# Patient Record
Sex: FEMALE | Race: BLACK OR AFRICAN AMERICAN | Employment: OTHER | URBAN - METROPOLITAN AREA
[De-identification: names, ages, dates, MRNs, and addresses within clinical notes are randomized per-mention and may not be internally consistent; named-entity substitution may affect disease eponyms.]

---

## 2017-03-21 PROCEDURE — 82570 ASSAY OF URINE CREATININE: CPT | Performed by: INTERNAL MEDICINE

## 2017-03-21 PROCEDURE — 83036 HEMOGLOBIN GLYCOSYLATED A1C: CPT | Performed by: INTERNAL MEDICINE

## 2017-03-21 PROCEDURE — 82607 VITAMIN B-12: CPT | Performed by: INTERNAL MEDICINE

## 2017-03-21 PROCEDURE — 82550 ASSAY OF CK (CPK): CPT | Performed by: INTERNAL MEDICINE

## 2017-03-21 PROCEDURE — 80061 LIPID PANEL: CPT | Performed by: INTERNAL MEDICINE

## 2017-03-21 PROCEDURE — 36415 COLL VENOUS BLD VENIPUNCTURE: CPT | Performed by: INTERNAL MEDICINE

## 2017-03-21 PROCEDURE — 82043 UR ALBUMIN QUANTITATIVE: CPT | Performed by: INTERNAL MEDICINE

## 2017-03-21 PROCEDURE — 80053 COMPREHEN METABOLIC PANEL: CPT | Performed by: INTERNAL MEDICINE

## 2017-03-21 PROCEDURE — 81003 URINALYSIS AUTO W/O SCOPE: CPT | Performed by: INTERNAL MEDICINE

## 2017-03-21 PROCEDURE — 84443 ASSAY THYROID STIM HORMONE: CPT | Performed by: INTERNAL MEDICINE

## 2017-06-13 PROBLEM — Z79.810 USE OF TAMOXIFEN (NOLVADEX): Status: ACTIVE | Noted: 2017-06-13

## 2017-06-14 ENCOUNTER — HOSPITAL ENCOUNTER (OUTPATIENT)
Facility: HOSPITAL | Age: 72
Setting detail: HOSPITAL OUTPATIENT SURGERY
Discharge: HOME | End: 2017-06-14
Attending: INTERNAL MEDICINE | Admitting: INTERNAL MEDICINE
Payer: MEDICARE

## 2017-06-14 ENCOUNTER — SURGERY (OUTPATIENT)
Age: 72
End: 2017-06-14

## 2017-06-14 PROCEDURE — 88305 TISSUE EXAM BY PATHOLOGIST: CPT | Performed by: INTERNAL MEDICINE

## 2017-06-14 PROCEDURE — 0DBP8ZX EXCISION OF RECTUM, VIA NATURAL OR ARTIFICIAL OPENING ENDOSCOPIC, DIAGNOSTIC: ICD-10-PCS | Performed by: INTERNAL MEDICINE

## 2017-06-14 PROCEDURE — 0D5P8ZZ DESTRUCTION OF RECTUM, VIA NATURAL OR ARTIFICIAL OPENING ENDOSCOPIC: ICD-10-PCS | Performed by: INTERNAL MEDICINE

## 2017-06-14 RX ORDER — MIDAZOLAM HYDROCHLORIDE 1 MG/ML
INJECTION INTRAMUSCULAR; INTRAVENOUS
Status: DISCONTINUED | OUTPATIENT
Start: 2017-06-14 | End: 2017-06-14

## 2017-06-14 RX ORDER — MIDAZOLAM HYDROCHLORIDE 1 MG/ML
1 INJECTION INTRAMUSCULAR; INTRAVENOUS EVERY 5 MIN PRN
Status: DISCONTINUED | OUTPATIENT
Start: 2017-06-14 | End: 2017-06-14

## 2017-06-14 RX ORDER — SODIUM CHLORIDE 0.9 % (FLUSH) 0.9 %
10 SYRINGE (ML) INJECTION AS NEEDED
Status: DISCONTINUED | OUTPATIENT
Start: 2017-06-14 | End: 2017-06-14

## 2017-06-14 RX ORDER — SODIUM CHLORIDE, SODIUM LACTATE, POTASSIUM CHLORIDE, CALCIUM CHLORIDE 600; 310; 30; 20 MG/100ML; MG/100ML; MG/100ML; MG/100ML
INJECTION, SOLUTION INTRAVENOUS CONTINUOUS
Status: DISCONTINUED | OUTPATIENT
Start: 2017-06-14 | End: 2017-06-14

## 2017-06-14 NOTE — OPERATIVE REPORT
COLONOSCOPY REPORT    Patient Name:  Valdemar Severe Record #: [id number]  YOB: 1945  Date of Procedure: 6/14/2017    Referring physician: Isidro Trevino MD     Colonoscopy to cecum and TI with hot snare polypectomy and coagulation

## 2017-06-15 VITALS
RESPIRATION RATE: 13 BRPM | OXYGEN SATURATION: 100 % | HEIGHT: 63 IN | DIASTOLIC BLOOD PRESSURE: 87 MMHG | HEART RATE: 51 BPM | SYSTOLIC BLOOD PRESSURE: 131 MMHG | BODY MASS INDEX: 34.91 KG/M2 | WEIGHT: 197 LBS

## 2017-06-15 NOTE — PROGRESS NOTES
Quick Note:    6/15/2017  Shawna Coyne  [address] [facility] [address]    Dear Ami Chand,       Here are the biopsy/pathology findings from your recent Colonoscopy :    an adenomatous polyp(s), which is a benign potentially pre-cancerous growth that was re

## 2018-04-20 PROCEDURE — 82607 VITAMIN B-12: CPT | Performed by: INTERNAL MEDICINE

## 2018-04-20 PROCEDURE — 82043 UR ALBUMIN QUANTITATIVE: CPT | Performed by: INTERNAL MEDICINE

## 2018-04-20 PROCEDURE — 82570 ASSAY OF URINE CREATININE: CPT | Performed by: INTERNAL MEDICINE

## 2018-07-23 PROCEDURE — 83970 ASSAY OF PARATHORMONE: CPT | Performed by: INTERNAL MEDICINE

## 2018-11-19 PROCEDURE — 88175 CYTOPATH C/V AUTO FLUID REDO: CPT | Performed by: OBSTETRICS & GYNECOLOGY

## 2019-01-21 PROBLEM — Z79.810 USE OF TAMOXIFEN (NOLVADEX): Status: RESOLVED | Noted: 2017-06-13 | Resolved: 2019-01-21

## 2019-06-11 PROBLEM — I12.9 SECONDARY DM WITH CKD STAGE 3 AND HYPERTENSION (HCC): Status: ACTIVE | Noted: 2019-06-11

## 2019-06-11 PROBLEM — N18.30 SECONDARY DM WITH CKD STAGE 3 AND HYPERTENSION (HCC): Status: ACTIVE | Noted: 2019-06-11

## 2019-06-11 PROBLEM — E13.22 SECONDARY DM WITH CKD STAGE 3 AND HYPERTENSION (HCC): Status: ACTIVE | Noted: 2019-06-11

## 2022-04-08 PROBLEM — Z86.0100 HISTORY OF COLON POLYPS: Status: ACTIVE | Noted: 2022-04-08

## 2022-04-08 PROBLEM — Z86.19 HISTORY OF HELICOBACTER PYLORI INFECTION: Status: ACTIVE | Noted: 2022-04-08

## 2022-04-08 PROBLEM — Z86.010 HISTORY OF COLON POLYPS: Status: ACTIVE | Noted: 2022-04-08

## 2023-03-27 RX ORDER — MELATONIN
325
COMMUNITY

## 2023-04-04 ENCOUNTER — HOSPITAL ENCOUNTER (OUTPATIENT)
Facility: HOSPITAL | Age: 78
Setting detail: HOSPITAL OUTPATIENT SURGERY
Discharge: HOME | End: 2023-04-04
Attending: INTERNAL MEDICINE | Admitting: INTERNAL MEDICINE
Payer: MEDICARE

## 2023-04-04 VITALS
HEIGHT: 63 IN | BODY MASS INDEX: 34.91 KG/M2 | SYSTOLIC BLOOD PRESSURE: 140 MMHG | WEIGHT: 197 LBS | HEART RATE: 70 BPM | OXYGEN SATURATION: 96 % | RESPIRATION RATE: 12 BRPM | DIASTOLIC BLOOD PRESSURE: 81 MMHG

## 2023-04-04 DIAGNOSIS — Q43.9 ANORECTAL ANOMALY: ICD-10-CM

## 2023-04-04 LAB — GLUCOSE BLDC GLUCOMTR-MCNC: 157 MG/DL (ref 70–99)

## 2023-04-04 PROCEDURE — 99152 MOD SED SAME PHYS/QHP 5/>YRS: CPT | Performed by: INTERNAL MEDICINE

## 2023-04-04 PROCEDURE — 0DBP8ZX EXCISION OF RECTUM, VIA NATURAL OR ARTIFICIAL OPENING ENDOSCOPIC, DIAGNOSTIC: ICD-10-PCS | Performed by: INTERNAL MEDICINE

## 2023-04-04 PROCEDURE — 82962 GLUCOSE BLOOD TEST: CPT

## 2023-04-04 PROCEDURE — 88305 TISSUE EXAM BY PATHOLOGIST: CPT | Performed by: INTERNAL MEDICINE

## 2023-04-04 RX ORDER — SODIUM CHLORIDE 0.9 % (FLUSH) 0.9 %
10 SYRINGE (ML) INJECTION AS NEEDED
Status: DISCONTINUED | OUTPATIENT
Start: 2023-04-04 | End: 2023-04-04

## 2023-04-04 RX ORDER — SODIUM CHLORIDE, SODIUM LACTATE, POTASSIUM CHLORIDE, CALCIUM CHLORIDE 600; 310; 30; 20 MG/100ML; MG/100ML; MG/100ML; MG/100ML
INJECTION, SOLUTION INTRAVENOUS CONTINUOUS
Status: DISCONTINUED | OUTPATIENT
Start: 2023-04-04 | End: 2023-04-04

## 2023-04-04 RX ORDER — MIDAZOLAM HYDROCHLORIDE 1 MG/ML
1 INJECTION INTRAMUSCULAR; INTRAVENOUS EVERY 5 MIN PRN
Status: DISCONTINUED | OUTPATIENT
Start: 2023-04-04 | End: 2023-04-04

## 2023-04-04 RX ORDER — MIDAZOLAM HYDROCHLORIDE 1 MG/ML
INJECTION INTRAMUSCULAR; INTRAVENOUS
Status: DISCONTINUED | OUTPATIENT
Start: 2023-04-04 | End: 2023-04-04

## 2023-04-04 NOTE — OPERATIVE REPORT
Flexible Sigmoidoscopy Operative Report    Vernon Clark Patient Status:  [facility]    1945 MRN [id number]   Location [facility] ENDOSCOPY LAB SUITES Attending Billy Swan MD   Hosp Day #   0 PCP Tj Avendano MD     Pre-Operative Diagnosis:   Anorectal anomaly    Post-Operative Diagnosis:  1. The prep was poor in most areas of the colon visualized. 2.  The scope was advanced to the mid sigmoid colon and could not be advanced further due to solid stool. 3.  There was evidence of left-sided diverticulosis. However no other sigmoid irregularities noted  4. On retroflexed view, there is evidence of a rectal lesion located just above the dentate line and abutting the dentate line. This lesion could be 2 separate lesions with 1 appearing to be atypical neoplastic polyp measuring approximately 12 mm. Adjacent to this and possibly communicating to this polyp was another rectal lesion along the dentate line that was mildly ulcerated and had more of a smooth appearance suggesting possible rectal prolapse related. Biopsies were taken from this anal rectal lesion separately. Biopsies were also taken from the likely rectal polyp in a separate container. This was not fully removed this was adjacent to mild to moderate sized hemorrhoids. 5.  Grade 2 internal hemorrhoids    Procedure Performed: FLEXIBLE SIGMOIDOSCOPY    Informed Consent: Informed consent for both the procedure and sedation were obtained from the patient. The potentially life-threatening complications of sedation, bleeding,  perforation, transfusion or repeat endoscopy  were reviewed along with the possible need for hospitalization, surgical management, transfusion or repeat endoscopy should one of these complications arise. The patient understands and is agreeable to proceed.   Sedation Type: Conscious Sedation- 5 mg of Versed, 100 mcg of Fentanyl given in divided doses as per protocol  Moderate Sedation Time: 15 min  A trained sedation nurse was present to assist in monitoring the patient during the entire length of moderate sedation time. Cecum Withdrawal Time:  N/A  Date of previous colonoscopy:  4/2022    Procedure Description: The patient was placed in the left lateral decubitus position. After careful digital rectal examination, the Pediatric colonoscope was inserted into the rectum and advanced to the level of the mid sigmoid colon under direct visualization. The cecum was identified by landmarks, including the appendiceal orifice and ileoceccal valve. Careful examination of the entire colon was performed during withdrawal of the endoscope. The scope was withdrawn to the rectum and retroflexion was performed. The patient tolerated the procedure well with no immediate complications. The patient was transferred to the recovery area in stable condition. Quality of Preparation: Adequate  Aronchick Bowel Prep Scale: Fair to Poor - 4  Findings:   1. The prep was poor in most areas of the colon visualized. 2.  The scope was advanced to the mid sigmoid colon and could not be advanced further due to solid stool. 3.  There was evidence of left-sided diverticulosis. However no other sigmoid irregularities noted  4. On retroflexed view, there is evidence of a rectal lesion located just above the dentate line and abutting the dentate line. This lesion could be 2 separate lesions with 1 appearing to be atypical neoplastic polyp measuring approximately 12 mm. Adjacent to this and possibly communicating to this polyp was another rectal lesion along the dentate line that was mildly ulcerated and had more of a smooth appearance suggesting possible rectal prolapse related. Biopsies were taken from this anal rectal lesion separately. Biopsies were also taken from the likely rectal polyp in a separate container. This was not fully removed this was adjacent to mild to moderate sized hemorrhoids.     5.  Grade 2 internal hemorrhoids    Recommendations:   1. Follow-up pathology results  2. We will need to determine endoscopic removal pending the biopsy results. This may need to be done with Dr. Mercedes Liu vs  transanal resection with the surgery service  Discharge: The patient was given an after visit summary detailing the procedure, findings, recommendations and follow up plans.      Aarti Saucedo MD  4/4/2023  8:00 AM

## 2023-04-04 NOTE — DISCHARGE INSTRUCTIONS

## 2023-05-31 ENCOUNTER — ANESTHESIA EVENT (OUTPATIENT)
Dept: ENDOSCOPY | Facility: HOSPITAL | Age: 78
End: 2023-05-31
Payer: MEDICARE

## 2023-05-31 ENCOUNTER — ANESTHESIA (OUTPATIENT)
Dept: ENDOSCOPY | Facility: HOSPITAL | Age: 78
End: 2023-05-31
Payer: MEDICARE

## 2023-05-31 ENCOUNTER — HOSPITAL ENCOUNTER (OUTPATIENT)
Facility: HOSPITAL | Age: 78
Setting detail: HOSPITAL OUTPATIENT SURGERY
Discharge: HOME | End: 2023-05-31
Attending: INTERNAL MEDICINE | Admitting: INTERNAL MEDICINE
Payer: MEDICARE

## 2023-05-31 VITALS
BODY MASS INDEX: 34.91 KG/M2 | SYSTOLIC BLOOD PRESSURE: 155 MMHG | TEMPERATURE: 98 F | HEART RATE: 56 BPM | WEIGHT: 197 LBS | OXYGEN SATURATION: 98 % | DIASTOLIC BLOOD PRESSURE: 76 MMHG | RESPIRATION RATE: 22 BRPM | HEIGHT: 63 IN

## 2023-05-31 DIAGNOSIS — D12.8 RECTAL ADENOMA: ICD-10-CM

## 2023-05-31 LAB — GLUCOSE BLDC GLUCOMTR-MCNC: 145 MG/DL (ref 70–99)

## 2023-05-31 PROCEDURE — 82962 GLUCOSE BLOOD TEST: CPT

## 2023-05-31 PROCEDURE — 88305 TISSUE EXAM BY PATHOLOGIST: CPT | Performed by: INTERNAL MEDICINE

## 2023-05-31 PROCEDURE — 0DBP8ZX EXCISION OF RECTUM, VIA NATURAL OR ARTIFICIAL OPENING ENDOSCOPIC, DIAGNOSTIC: ICD-10-PCS | Performed by: INTERNAL MEDICINE

## 2023-05-31 PROCEDURE — 3E0H8GC INTRODUCTION OF OTHER THERAPEUTIC SUBSTANCE INTO LOWER GI, VIA NATURAL OR ARTIFICIAL OPENING ENDOSCOPIC: ICD-10-PCS | Performed by: INTERNAL MEDICINE

## 2023-05-31 RX ORDER — SODIUM CHLORIDE, SODIUM LACTATE, POTASSIUM CHLORIDE, CALCIUM CHLORIDE 600; 310; 30; 20 MG/100ML; MG/100ML; MG/100ML; MG/100ML
INJECTION, SOLUTION INTRAVENOUS CONTINUOUS
Status: DISCONTINUED | OUTPATIENT
Start: 2023-05-31 | End: 2023-05-31

## 2023-05-31 RX ORDER — LIDOCAINE HYDROCHLORIDE 10 MG/ML
INJECTION, SOLUTION EPIDURAL; INFILTRATION; INTRACAUDAL; PERINEURAL AS NEEDED
Status: DISCONTINUED | OUTPATIENT
Start: 2023-05-31 | End: 2023-05-31 | Stop reason: SURG

## 2023-05-31 RX ADMIN — LIDOCAINE HYDROCHLORIDE 30 MG: 10 INJECTION, SOLUTION EPIDURAL; INFILTRATION; INTRACAUDAL; PERINEURAL at 13:11:00

## 2023-05-31 RX ADMIN — SODIUM CHLORIDE, SODIUM LACTATE, POTASSIUM CHLORIDE, CALCIUM CHLORIDE: 600; 310; 30; 20 INJECTION, SOLUTION INTRAVENOUS at 13:08:00

## 2023-05-31 RX ADMIN — SODIUM CHLORIDE, SODIUM LACTATE, POTASSIUM CHLORIDE, CALCIUM CHLORIDE: 600; 310; 30; 20 INJECTION, SOLUTION INTRAVENOUS at 14:06:00

## 2023-05-31 NOTE — DISCHARGE INSTRUCTIONS
Clear liquid diet for today then full liquids tomorrow then soft for 3 days then as tolerated. Use stool softener once daily. (Colace)  Monitor for signs of bleeding. Take Augmentin 875 mg twice a day starting tonight for 5 days. Use topical lidocaine for pain. Home Care Instructions for Colonoscopy with Sedation    Diet:  - Resume your regular diet as tolerated unless otherwise instructed. - Start with light meals to minimize bloating.  - Do not drink alcohol today. Medication:  - If you have questions about resuming your normal medications, please contact your Primary Care Physician. Activities:  - Take it easy today. Do not return to work today. - Do not drive today. - Do not operate any machinery today (including kitchen equipment). Colonoscopy:  - You may notice some rectal \"spotting\" (a little blood on the toilet tissue) for a day or two after the exam. This is normal.  - If you experience any rectal bleeding (not spotting), persistent tenderness or sharp severe abdominal pains, oral temperature over 100 degrees Fahrenheit, light-headedness or dizziness, or any other problems, contact your doctor. **If unable to reach your doctor, please go to the [facility] Emergency Room**    - Your referring physician will receive a full report of your examination.  - If you do not hear from your doctor's office within two weeks of your biopsy, please call them for your results. You may be able to see your laboratory results in [facility] between 4 and 7 business days. In some cases, your physician may not have viewed the results before they are released to [address] If you have questions regarding your results contact the physician who ordered the test/exam by phone or via [address] by choosing \"Ask a Medical Question. \"

## 2023-05-31 NOTE — OPERATIVE REPORT
[id number]  Susan Devries  2/23/1945 5/31/2023      Preoperative Diagnosis: Large rectal polyp. Patient was referred for endoscopic mucosal resection  Postoperative Diagnosis: Hemorrhoids and large rectal polyp  Procedure Performed: Colonoscopy to the cecum with endoscopic mucosal resection    Anesthesia Given:  MAC  Medication: Zosyn 3.375 g intravenously was given  Colon  Preparation: Adequate  Colonoscopy withdrawal time: 48 minutes  Specimen: Rectal polyp  Endoscopist:   Elizabeth Peacock MD  EBL: none  Complications: no immediate  Informed Consent: Informed consent for both the procedure and sedation were obtained from the patient. The potentially life-threatening complications of sedation, bleeding,  Perforation, transfusion or repeat endoscopy were reviewed along with the possible need for hospitalization, surgical management, transfusion or repeat endoscopy should one of these complications arise. The patient understands and is agreeable to proceed. Procedure:   After the patient was interviewed and the procedure again discussed and questions addressed, the patient was brought to the GI Lab and monitoring of the B/P, pulse, and pulse oximetry was performed. The patient was then placed in the left lateral decubitus position and sedated with divided doses of IV medication; continuous vital signs were monitored throughout the procedure. The Olympus video adult variable stiffness colonoscope was then inserted into the rectum after an unremarkable digital rectal exam.  The distal end clear detachable cap was fitted onto the tip of the scope prior to inserting it into the rectum. The endoscope was advanced to the cecum without difficulty; upon insertion and withdrawl the mucosa was carefully examined and the preparation was adequate. The visualized mucosal pattern was unremarkable.     In the rectum starting from the dentate line there was approximately 2 cm adenomatous appearing polyp occupying approximately 30% of the circumference of the rectal wall. To the left of it there appeared to be a prolapsing hemorrhoid. The overlying rectal mucosa appeared unremarkable. At this point the scope was exchanged for the adult flexible upper scope. After placement of the distal end clear detachable cap the scope was introduced into the rectum. The lesion was then lifted with ELIVIEW lifting solution and resected utilizing an EMR technique with the use of 13 mm cautery snare. Few submucosal bleeding blood vessels were cauterized with the use of the soft coag Olympus grasper. The margins of resection were then fulgurated with the tip of the coag grasper to minimize polyp recurrence. A total of 4 endoscopic clips were placed at the site of resection to minimize post EMR bleed. No obvious perforation or active bleeding was noted at the end of the procedure. The patient was informed of the endoscopic findings and was also given a copy of the findings, postoperative instructions, and postoperative precautions. IMPRESSION:  Findings discussed above    PLAN:      Clear liquid diet for today then full liquids tomorrow then soft for 3 days then as tolerated. Use stool softener once daily. Monitor for signs of bleeding. Take Augmentin 875 mg twice a day starting tonight for 5 days. Check final biopsy results. If no high-grade dysplasia or invasive cancer then flexible sigmoidoscopy in 6 months.     Shashank Singh MD

## 2024-12-23 ENCOUNTER — ANESTHESIA (OUTPATIENT)
Dept: ENDOSCOPY | Facility: HOSPITAL | Age: 79
End: 2024-12-23
Payer: MEDICARE

## 2024-12-23 ENCOUNTER — HOSPITAL ENCOUNTER (OUTPATIENT)
Facility: HOSPITAL | Age: 79
Setting detail: HOSPITAL OUTPATIENT SURGERY
Discharge: HOME | End: 2024-12-23
Attending: INTERNAL MEDICINE | Admitting: INTERNAL MEDICINE
Payer: MEDICARE

## 2024-12-23 ENCOUNTER — ANESTHESIA EVENT (OUTPATIENT)
Dept: ENDOSCOPY | Facility: HOSPITAL | Age: 79
End: 2024-12-23
Payer: MEDICARE

## 2024-12-23 VITALS
HEIGHT: 63 IN | DIASTOLIC BLOOD PRESSURE: 67 MMHG | OXYGEN SATURATION: 98 % | RESPIRATION RATE: 15 BRPM | SYSTOLIC BLOOD PRESSURE: 121 MMHG | WEIGHT: 195 LBS | HEART RATE: 63 BPM | BODY MASS INDEX: 34.55 KG/M2

## 2024-12-23 DIAGNOSIS — D12.8 ADENOMATOUS RECTAL POLYP: ICD-10-CM

## 2024-12-23 DIAGNOSIS — D12.8 RECTAL ADENOMA: ICD-10-CM

## 2024-12-23 LAB — GLUCOSE BLDC GLUCOMTR-MCNC: 160 MG/DL (ref 70–99)

## 2024-12-23 PROCEDURE — 0DBP8ZX EXCISION OF RECTUM, VIA NATURAL OR ARTIFICIAL OPENING ENDOSCOPIC, DIAGNOSTIC: ICD-10-PCS | Performed by: INTERNAL MEDICINE

## 2024-12-23 PROCEDURE — 88305 TISSUE EXAM BY PATHOLOGIST: CPT | Performed by: INTERNAL MEDICINE

## 2024-12-23 PROCEDURE — 82962 GLUCOSE BLOOD TEST: CPT

## 2024-12-23 PROCEDURE — 0DBN8ZX EXCISION OF SIGMOID COLON, VIA NATURAL OR ARTIFICIAL OPENING ENDOSCOPIC, DIAGNOSTIC: ICD-10-PCS | Performed by: INTERNAL MEDICINE

## 2024-12-23 PROCEDURE — 0DBK8ZX EXCISION OF ASCENDING COLON, VIA NATURAL OR ARTIFICIAL OPENING ENDOSCOPIC, DIAGNOSTIC: ICD-10-PCS | Performed by: INTERNAL MEDICINE

## 2024-12-23 RX ORDER — LIDOCAINE HYDROCHLORIDE 10 MG/ML
INJECTION, SOLUTION EPIDURAL; INFILTRATION; INTRACAUDAL; PERINEURAL AS NEEDED
Status: DISCONTINUED | OUTPATIENT
Start: 2024-12-23 | End: 2024-12-23 | Stop reason: SURG

## 2024-12-23 RX ORDER — NALOXONE HYDROCHLORIDE 0.4 MG/ML
0.08 INJECTION, SOLUTION INTRAMUSCULAR; INTRAVENOUS; SUBCUTANEOUS ONCE AS NEEDED
Status: DISCONTINUED | OUTPATIENT
Start: 2024-12-23 | End: 2024-12-23

## 2024-12-23 RX ORDER — SODIUM CHLORIDE, SODIUM LACTATE, POTASSIUM CHLORIDE, CALCIUM CHLORIDE 600; 310; 30; 20 MG/100ML; MG/100ML; MG/100ML; MG/100ML
INJECTION, SOLUTION INTRAVENOUS CONTINUOUS
Status: DISCONTINUED | OUTPATIENT
Start: 2024-12-23 | End: 2024-12-23

## 2024-12-23 RX ADMIN — SODIUM CHLORIDE, SODIUM LACTATE, POTASSIUM CHLORIDE, CALCIUM CHLORIDE: 600; 310; 30; 20 INJECTION, SOLUTION INTRAVENOUS at 10:01:00

## 2024-12-23 RX ADMIN — LIDOCAINE HYDROCHLORIDE 50 MG: 10 INJECTION, SOLUTION EPIDURAL; INFILTRATION; INTRACAUDAL; PERINEURAL at 10:04:00

## 2024-12-23 RX ADMIN — SODIUM CHLORIDE, SODIUM LACTATE, POTASSIUM CHLORIDE, CALCIUM CHLORIDE: 600; 310; 30; 20 INJECTION, SOLUTION INTRAVENOUS at 10:24:00

## 2024-12-23 NOTE — OPERATIVE REPORT
Colonoscopy Operative Report    Florence Dexter Patient Status:  [facility] Outpatient Surgery    1945 MRN [id number]   Location [facility] ENDOSCOPY LAB SUITES Attending Jermaine Candelaria MD   Hosp Day #   0 PCP Debi Pineda MD     Pre-Operative Diagnosis: Adenomatous rectal polyp/Rectal adenoma    Post-Operative Diagnosis:  There was an ascending colon sessile polyp 3 mm fully removed with cold forceps.   There was a sigmoid colon sessile polyp 3 mm removed with cold forceps.  There is a rectal polypoid lesion in the distalmost rectum at the level of the anorectal junction.  This was minimally ulcerated and measured approximately 12 mm.  This was only biopsied with cold forceps as this was in an area of grade 2 internal hemorrhoids as well.  This had an appearance of recurrent neoplastic polyp.   Left-sided diverticulosis  Small internal hemorrhoids    Procedure Performed: COLONOSCOPY with biopsies    Informed Consent: Informed consent for both the procedure and sedation were obtained from the patient. The potentially life-threatening complications of sedation, bleeding,  perforation, transfusion or repeat endoscopy  were reviewed along with the possible need for hospitalization, surgical management, transfusion or repeat endoscopy should one of these complications arise. The patient understands and is agreeable to proceed.  Sedation Type: MAC-Patient received sedation with monitored anesthesia provided by an anesthesiologist  Moderate Sedation Time: None.  Deep sedation provided by anesthesia.  Cecum Withdrawal Time:  12 min  Date of previous colonoscopy:     Procedure Description: The patient was placed in the left lateral decubitus position.  After careful digital rectal examination, the Adult colonoscope was inserted into the rectum and advanced to the level of the cecum under direct visualization. The cecum was identified by landmarks, including  the appendiceal orifice and ileoceccal valve. Careful examination of the entire colon was performed during withdrawal of the endoscope. The scope was withdrawn to the rectum and retroflexion was performed.  The patient tolerated the procedure well with no immediate complications. The patient was transferred to the recovery area in stable condition.  Quality of Preparation: Adequate  Aronchick Bowel Prep Scale:  Excellent - 1    Recommendations:   Follow-up pathology results  Fiber supplementation such as benefiber or citrucel daily.  Repeat flex sig/colonoscopy pending biopsy results  Discharge:  The patient was given an after visit summary detailing the procedure, findings, recommendations and follow up plans.     Jermaine Candelaria MD  12/23/2024  10:21 AM

## 2024-12-23 NOTE — ANESTHESIA PREPROCEDURE EVALUATION
Anesthesia PreOp Note    HPI:     Florence Dexter is a 79 year old female who presents for preoperative consultation requested by: Jermaine Candelaria MD    Date of Surgery: 12/23/2024    Procedure(s):  COLONOSCOPY  Indication: Adenomatous rectal polyp/Rectal adenoma    Relevant Problems   No relevant active problems       NPO:  Last Liquid Consumption Date: 12/23/24  Last Liquid Consumption Time: 0500  Last Solid Consumption Date: 12/21/24  Last Solid Consumption Time: 1500  Last Liquid Consumption Date: 12/23/24          History Review:  Patient Active Problem List    Diagnosis Date Noted    History of colon polyps 04/08/2022    History of Helicobacter pylori infection 04/08/2022    Secondary DM with CKD stage 3 and hypertension (HCC) 06/11/2019    BMI 34.0-34.9,adult 09/20/2016    Vitreous syneresis of both eyes 05/27/2016    Renal disease due to hypertension 03/09/2016    Type 2 diabetes mellitus with stage 3a chronic kidney disease, without long-term current use of insulin (HCC) 03/30/2015    History of breast cancer 02/11/2013    Essential hypertension 01/29/2013    Dyslipidemia associated with type 2 diabetes mellitus (HCC) 01/29/2013    Single kidney 01/29/2013       Past Medical History:    Breast cancer (HCC)    Left DCIS    Calculus of kidney    Cataract, bilateral    DCIS (ductal carcinoma in situ) of breast    high grade; lumpectomy; no radiation    High blood pressure    High cholesterol    Hx of motion sickness    Renal disorder    LEFT KIDNEY REMOVAL 1992    Type II or unspecified type diabetes mellitus without mention of complication, not stated as uncontrolled       Past Surgical History:   Procedure Laterality Date    Cataract      Colonoscopy  2007    Colonoscopy N/A 6/14/2017    Procedure: COLONOSCOPY;  Surgeon: Hernandez Mullins MD;  Location: [facility] ENDOSCOPY    Colonoscopy N/A 5/31/2023    Procedure: COLONOSCOPY with ENDOSCOPIC MUCOSAL RESECTION;  Surgeon: Trev Field MD;  Location: [facility] ENDOSCOPY     D & c  2012    D AND C; OVARIAN CYST-RESOLVED    Dilation/curettage,diagnostic      Hx breast cancer Left May 2013    DCIS    Kidney surgery      Lumpectomy left  5/24/13    DCIS    Nephrectomy Left     Other surgical history  1992    LEFT NEPHRECTOMY/KIDNEY STONE    Other surgical history  05/2013    left lumpectomy       Prescriptions Prior to Admission[1]  Current Medications and Prescriptions Ordered in Epic[2]    Allergies[3]    Family History   Problem Relation Age of Onset    Cancer Father         STOMACH    Diabetes Mother         TYPE 2    Heart Disorder Mother 70        STROKE/MI    Hypertension Mother     Lipids Mother     Diabetes Son 35        TYPE 2    Obesity Son     Diabetes Sister     Cancer Sister 68        OVARIAN     Social History     Socioeconomic History    Marital status:    Occupational History    Occupation: Retired   Tobacco Use    Smoking status: Never    Smokeless tobacco: Never   Vaping Use    Vaping status: Never Used   Substance and Sexual Activity    Alcohol use: No    Drug use: No    Sexual activity: Yes     Partners: Male     Comment: ;  dialysis oct 2017   Other Topics Concern     Service No    Blood Transfusions No    Caffeine Concern No    Occupational Exposure No    Hobby Hazards No    Sleep Concern No    Stress Concern No    Weight Concern No    Special Diet Yes     Comment: diabetic    Exercise Yes     Comment: WALK 2X/WEEK 10 MIN    Seat Belt Yes    Self-Exams Yes       Available pre-op labs reviewed.     Lab Results   Component Value Date    PGLU 160 (H) 12/23/2024          Vital Signs:  Body mass index is 34.54 kg/m².   height is 1.6 m (5' 3\") and weight is 88.5 kg (195 lb). Her blood pressure is 124/63 and her pulse is 69. Her respiration is 18 and oxygen saturation is 98%.   Vitals:    12/18/24 1237 12/23/24 0932   BP:  124/63   Pulse:  69   Resp:  18   SpO2:  98%   Weight: 88.5 kg (195 lb)    Height: 1.6 m (5' 3\")         Anesthesia  Evaluation     Patient summary reviewed and Nursing notes reviewed    Airway   Mallampati: II  TM distance: >3 FB  Dental    (+) partials    Comment: Upper partial and several missing teeth on bottom    Pulmonary - negative ROS and normal exam    breath sounds clear to auscultation  Cardiovascular - negative ROS and normal exam  Exercise tolerance: good  (+) hypertension    Rhythm: regular  Rate: normal    Neuro/Psych - negative ROS     GI/Hepatic/Renal - negative ROS   (+) chronic renal disease CRI    Endo/Other - negative ROS   (+) diabetes mellitus  Abdominal   (+) obese                 Anesthesia Plan:   ASA:  3  Plan:   MAC  Informed Consent Plan and Risks Discussed With:  Patient      I have informed Florence Dexter and/or legal guardian or family member of the nature of the anesthetic plan, benefits, risks including possible dental damage if relevant, major complications, and any alternative forms of anesthetic management.   All of the patient's questions were answered to the best of my ability. The patient desires the anesthetic management as planned.  Sarah Boecker, LAURO  12/23/2024 9:54 AM  Present on Admission:  **None**           [1]   Medications Prior to Admission   Medication Sig Dispense Refill Last Dose/Taking    ferrous sulfate 325 (65 FE) MG Oral Tab EC Take 1 tablet (325 mg total) by mouth daily with breakfast.   12/16/2024    amLODIPine 5 MG Oral Tab Take 1 tablet (5 mg total) by mouth daily. 90 tablet 2 12/22/2024 Morning    nateglinide 120 MG Oral Tab Take 1 tablet (120 mg total) by mouth 3 (three) times daily with meals. 270 tablet 3 12/21/2024    atorvastatin 10 MG Oral Tab Take 1 tablet (10 mg total) by mouth nightly. 90 tablet 3 12/21/2024    metFORMIN HCl  MG Oral Tablet 24 Hr Take 2 tablets (1,000 mg total) by mouth 2 (two) times daily with meals. 360 tablet 3 12/22/2024 Morning    glipiZIDE ER 10 MG Oral Tablet 24 Hr Take 1 tablet (10 mg total) by mouth 2 (two) times daily. 180 tablet  1 12/22/2024 Morning    losartan 100 MG Oral Tab Take 1 tablet (100 mg total) by mouth daily. 90 tablet 3 12/22/2024 Morning    ERGOCALCIFEROL 1.25 MG (58383 UT) Oral Cap TAKE 1 CAPSULE BY MOUTH EVERY 14 DAYS 6 capsule 2 12/9/2024    aspirin 81 MG Oral Tab Take 1 tablet (81 mg total) by mouth daily. 90 tablet 1 12/16/2024    OneTouch Delica Lancets 33G Does not apply Misc 1 lancet by Does not apply route daily. 1 Box 3     ONETOUCH ULTRASOFT LANCETS Does not apply Misc CHECK SUGAR DAILY 100 each 3     Glucose Blood In Vitro Strip Check sugar daily 100 each 3     Blood Glucose Monitoring Suppl (ONETOUCH ULTRA 2) w/Device Does not apply Kit E11.22 N18.3  May substitute kit and test strips and lancets as covered by insurance 1 kit 0    [2]   Current Facility-Administered Medications Ordered in Epic   Medication Dose Route Frequency Provider Last Rate Last Admin    lactated ringers infusion   Intravenous Continuous Jermaine Candelaria MD 20 mL/hr at 12/23/24 0933 New Bag at 12/23/24 0933     No current [facility]-ordered outpatient medications on file.   [3]   Allergies  Allergen Reactions    Pravastatin MYALGIA     elev cpk

## 2024-12-23 NOTE — DISCHARGE INSTRUCTIONS
Home Care Instructions for Colonoscopy  Diet:  - Resume your regular diet as tolerated unless otherwise instructed.  - Start with light meals to minimize bloating.  - Do not drink alcohol today.    Medication:  - If you have questions about resuming your normal medications, please contact your Primary Care Physician.    Activities:  - Take it easy today. Do not return to work today.  - Do not drive today.  - Do not operate any machinery today (including kitchen equipment).    Colonoscopy:  - You may notice some rectal \"spotting\" (a little blood on the toilet tissue) for a day or two after the exam. This is normal.  - If you experience any rectal bleeding (not spotting), persistent tenderness or sharp severe abdominal pains, oral temperature over 100 degrees Fahrenheit, light-headedness or dizziness, or any other problems, contact your doctor.      **If unable to reach your doctor, please go to the [facility] Emergency Room**    - Your referring physician will receive a full report of your examination.  - If you do not hear from your doctor's office within two weeks of your biopsy, please call them for your results.    You may be able to see your laboratory results in [facility] between 4 and 7 business days.  In some cases, your physician may not have viewed the results before they are released to [employer].  If you have questions regarding your results contact the physician who ordered the test/exam by phone or via [facility] by choosing \"Ask a Medical Question.\"

## 2024-12-23 NOTE — H&P
REFERRING PHYSICIAN: Dr. Leslie ref. provider found  Occupation:  [employer]     HPI:   Florence Dexter is a 79 year old female.      Patient presents with:  Follow - Up        This is a 78 y/o AA female with pmh sig for HTN, DM II, hyperlipidemia here for follow up of iron deficiency anemia.  She was first told about iron deficiency anemia in early 2022.  Her hemoglobin was noted to be 10.9.  MCV was normal.  Ferritin was low end of normal at 15, iron saturation low at 10%.       Her initial colonoscopy in 6/2017 with one 7mm tubular adenoma removed.   No Gerd.  No dysphagia.    She had previous EGD/colonoscopy with me on 4/5/22:  Post-Operative Diagnosis:   1.  Benign appearing small distal esophageal strictures.  S/p biopsies.  Nonobstructive, scope passes freely.  Biopsies negative  2.  Mild gastritis - + hpylori  3.  Normal duodenum - biopsies negative  4.  Cecal/appendiceal polyp removed - TA  5.  Ascending colon 12mm removed - TA  6.  Ascending colon small polyp, 3mm removed, significant oozing s/p clips X 2.  TA  7.  Left sided diverticulosis  8.  Rectal lesion at the dentate line.  ? Inflamed hemorrhoid vs neoplastic polyp.  S/p biopsies - mucosal prolapse  9.  Grade II internal hemorrhoids     Patient was treated with prevpac for the hpylori infection.  She tolerated well.  Negative hpylori stool antigen in 5/2022.       She had repeat flex sig with me in 4/2023.  This revealed:  Post-Operative Diagnosis:  1. The prep was poor in most areas of the colon visualized.  2. The scope was advanced to the mid sigmoid colon and could not be advanced further due to solid stool.  3. There was evidence of left-sided diverticulosis. However no other sigmoid irregularities noted  4. On retroflexed view, there is evidence of a rectal lesion located just above the dentate line and abutting the dentate line. This lesion could be 2 separate lesions with 1 appearing to be atypical neoplastic polyp measuring approximately 12  mm. Adjacent to this and possibly communicating to this polyp was another rectal lesion along the dentate line that was mildly ulcerated and had more of a smooth appearance suggesting possible rectal prolapse related. Biopsies were taken from this anal rectal lesion separately. Biopsies were also taken from the likely rectal polyp in a separate container. This was not fully removed this was adjacent to mild to moderate sized hemorrhoids.  BIOPSIES RETURNED WITH TUBULAR ADENOMAS.  5. Grade 2 internal hemorrhoids     She underwent subsequent Colonoscopy with EMR of large rectal polyp on 5/31/23.  Polyp was a 2-3 cm polyp that was removed with EMR.  Site was clipped.   Pathology with evidence of severe/high-grade dysplasia.    She saw me in 9/2023.  She was feeling well.    Repeat Colonoscopy performed 10/19/23.  This revealed:  Post-Operative Diagnosis:   There was evidence of a rectal polyp in the distal rectum in the area of previous polypectomy.  This measured about 15mm.  This was removed with hot cautery snare.  There was evidence of some mild residual polyp at the edges that were fully removed with cold forceps.  Polyp was felt to be fully removed and extended to the dentate line.   Rectal mucosal with only prolapse features.  No neoplastic tissue.  No other colon polyps  Left-sided diverticulosis        Occasional lower abdominal discomfort, but otherwise feeling well. No diarrhea.   Occasional constipation.  No rectal bleeding.   Weight has been stable.                      Current Outpatient Medications   Medication Sig Dispense Refill    losartan 100 MG Oral Tab TAKE 1 TABLET EVERY  tablet 0    metFORMIN  MG Oral Tablet 24 Hr TAKE 2 TABLETS TWICE A DAY WITH MEALS 400 tablet 0    Alcohol Swabs (DROPSAFE ALCOHOL PREP) 70 % Does not apply Pads USE AS DIRECTED TO CHECK BLOOD SUGAR ONE TIME DAILY 200 each 3    amLODIPine 5 MG Oral Tab Take 1 tablet (5 mg total) by mouth daily. 90 tablet 1     Accu-Chek Softclix Lancets Does not apply Misc TEST BLOOD SUGAR TWICE DAILY 200 each 0    nateglinide 120 MG Oral Tab TAKE 1 TABLET TWICE DAILY WITH MEALS 200 tablet 0    glipiZIDE ER 10 MG Oral Tablet 24 Hr Take 1 tablet (10 mg total) by mouth 2 (two) times daily. 200 tablet 1    Glucose Blood (ACCU-CHEK GUIDE) In Vitro Strip TEST BLOOD SUGAR TWICE DAILY 200 strip 3    ergocalciferol 1.25 MG (56889 UT) Oral Cap TAKE 1 CAPSULE BY MOUTH EVERY 14 DAYS 6 capsule 2    ATORVASTATIN 10 MG Oral Tab TAKE 1 TABLET EVERY NIGHT 90 tablet 3    Blood Glucose Monitoring Suppl (ACCU-CHEK GUIDE ME) w/Device Does not apply Kit TEST ONCE DAILY 1 kit 0    hydrocortisone 25 MG Rectal Suppos Place 1 suppository (25 mg total) rectally 2 (two) times daily as needed for Hemorrhoids. 30 suppository 2    aspirin 81 MG Oral Tab Take 1 tablet (81 mg total) by mouth daily. 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     high grade; lumpectomy; no radiation    High blood pressure      High cholesterol      Hx of lithotripsy      Type II or unspecified type diabetes mellitus without mention of complication, not stated as uncontrolled 2008            Past Surgical History:   Procedure Laterality Date    CATARACT        COLONOSCOPY   2007    COLONOSCOPY N/A 06/14/2017     Procedure: COLONOSCOPY;  Surgeon: Hernandez Mullins MD;  Location: [facility] ENDOSCOPY    COLONOSCOPY        D & C   2012     D AND C; OVARIAN CYST-RESOLVED    DILATION/CURETTAGE,DIAGNOSTIC        EGD N/A 04/05/2022     Procedure: ESOPHAGOGASTRODUODENOSCOPY, with bxs COLONOSCOPY with polypectomy and bxs;  Surgeon: Jermaine Candelaria MD;  Location: [facility], [facility]    HX BREAST CANCER Left 05/2013     DCIS    KIDNEY SURGERY Left       NEPHRECTOMY    LUMPECTOMY LEFT   05/24/2013     DCIS    NEPHRECTOMY  Left      OTHER SURGICAL HISTORY   1992     LEFT NEPHRECTOMY/KIDNEY STONE    OTHER SURGICAL HISTORY   05/2013     left lumpectomy    UPPER GI ENDOSCOPY,EXAM          Social History    Tobacco Use      Smoking status: Never        Passive exposure: Never      Smokeless tobacco: Never    Vaping Use      Vaping status: Never Used    Alcohol use: No    Drug use: No         FAMILY HX: GI HX: None, no hx of colon cancer        Family History   Problem Relation Age of Onset    Cancer Father           STOMACH    Diabetes Mother           TYPE 2    Heart Disorder Mother 70         STROKE/MI    Hypertension Mother      Lipids Mother      Diabetes Son 35         TYPE 2    Obesity Son      Diabetes Sister      Cancer Sister 68         OVARIAN         REVIEW OF SYSTEMS:   GENERAL: feels well otherwise  SKIN: denies any unusual skin lesions  EYES: denies blurred vision or double vision  HEENT: denies nasal congestion, sinus pain or ST  LUNGS: denies shortness of breath with exertion  CARDIOVASCULAR: denies chest pain on exertion  GI: as above     EXAM:   Ht 5' 3\" (1.6 m)   Wt 198 lb (89.8 kg)   BMI 35.07 kg/m²   GENERAL: well developed, well nourished, in no apparent distress  SKIN: no rashes, no suspicious lesions  HEENT: atraumatic, normocephalic, ears and throat are clear  EYES: PERRLA, EOMI, normal optic disk,conjunctiva are clear  NECK: supple, no adenopathy, no bruits  CHEST: no chest tenderness  LUNGS: clear to auscultation  CARDIO: RRR without murmur  GI: good BS's and no masses, HSM or tenderness  RECTAL: Exam not done.  MUSCULOSKELETAL: back is not tender,FROM of the back  EXTREMITIES: no cyanosis, clubbing or edema  NEURO: Oriented times three, cranial nerves are intact, motor and sensory are grossly intact        ASSESSMENT AND PLAN:      1.  Iron deficiency anemia  -hgb level normal in 3/2024.     -EGD/colonoscopy in 2022 with hpylori gastritis s/p prevpac, colon polyps, rectal lesion - biopsies negative.   -repeat  flex sig and colonoscopy in 4/2023 and 5/2023 as above.  No other source for iron deficiency.      -still on iron pill once daily.       2.  History of colon polyps  -multiple colon polyps removed at time of previous colonoscopy.    -flex sig with EMR of rectal polyp in 5/2023:  TVA with HGD.  Removed.   -repeat colonoscopy in 10/2023:  small residual polyp - nonneoplastic prolapse tissue.       3.  History of nephrectomy.   -only has one kidney  -Cr baseline of 1.06     4.  Hpylori gastritis  -s/p prevpac.   -stool for hpylori Ag NEGATIVE IN 5/2022.   Confirmed eradication     5.  Advanced rectal polyp - s/p removal with EMR in 5/2023.   -biopsies with high grade dysplasia  -doing well.    -repeat colonoscopy in 10/2023:  negative.          Recs:  1.  Continue iron pills once daily  2.  colonoscopy  3.  Fiber supplementation such as benefiber or citrucel daily.

## 2024-12-23 NOTE — ANESTHESIA POSTPROCEDURE EVALUATION
9Patient: Florence Dexter    Procedure Summary       Date: 12/23/24 Room / Location: [facility] ENDOSCOPY 05 / [facility] ENDOSCOPY    Anesthesia Start: 1001 Anesthesia Stop: 1024    Procedure: COLONOSCOPY Diagnosis:       Adenomatous rectal polyp      Rectal adenoma      (colon polyps, diverticulosis, rectal lesion)    Surgeons: Jermaine Candelaria MD Anesthesiologist: Boecker, Sarah, CRNA    Anesthesia Type: MAC ASA Status: 3            Anesthesia Type: MAC    Vitals Value Taken Time   /69 12/23/24 1025   Temp 97 12/23/24 1028   Pulse 65 12/23/24 1027   Resp 16 12/23/24 1027   SpO2 96 % 12/23/24 1027   Vitals shown include unfiled device data.    [facility] AN Post Evaluation:   Patient Evaluated in PACU  Patient Participation: complete - patient participated  Level of Consciousness: awake and alert  Pain Score: 0  Pain Management: adequate  Airway Patency:patent  Dental exam unchanged from preop  Yes    Nausea/Vomiting: none  Cardiovascular Status: acceptable  Respiratory Status: acceptable  Postoperative Hydration acceptable      Sarah Boecker, CRNA  12/23/2024 10:28 AM

## (undated) DEVICE — CAP SEALING, REVEAL 15.0MM

## (undated) DEVICE — CO2 CANNULA,SSOFT,ADLT,7O2,4CO2,FEMALE: Brand: MEDLINE

## (undated) DEVICE — 60 ML SYRINGE REGULAR TIP: Brand: MONOJECT

## (undated) DEVICE — SINGLE USE ELECTROSURGICAL HEMOSTATIC FORCEPS: Brand: SINGLE USE ELECTROSURGICAL HEMOSTATIC FORCEPS

## (undated) DEVICE — FORCEP RADIAL JAW 4

## (undated) DEVICE — KIT CLEAN ENDOKIT 1.1OZ GOWNX2

## (undated) DEVICE — Device: Brand: DUAL NARE NASAL CANNULAE FEMALE LUER CON 7FT O2 TUBE

## (undated) DEVICE — TRAP 4 CPTR CHMBR N EZ INLN

## (undated) DEVICE — MEDI-VAC NON-CONDUCTIVE SUCTION TUBING 6MM X 1.8M (6FT.) L: Brand: CARDINAL HEALTH

## (undated) DEVICE — SNARE CAPTIFLEX MICRO-OVL OLY

## (undated) DEVICE — REM POLYHESIVE ADULT PATIENT RETURN ELECTRODE: Brand: VALLEYLAB

## (undated) DEVICE — NEEDLE CONTRAST INTERJECT 25G

## (undated) DEVICE — KIT ENDO ORCAPOD 160/180/190

## (undated) DEVICE — HEXAGONAL CAPTIVATOR STIFF 13M

## (undated) DEVICE — Device

## (undated) DEVICE — ENDOSCOPY PACK - LOWER: Brand: MEDLINE INDUSTRIES, INC.

## (undated) DEVICE — V2 SPECIMEN COLLECTION MANIFOLD KIT: Brand: NEPTUNE

## (undated) DEVICE — CLIP HEMOSTASIS LOCKADO 16X235

## (undated) DEVICE — GIJAW SINGLE-USE BIOPSY FORCEPS WITH NEEDLE: Brand: GIJAW

## (undated) DEVICE — Device: Brand: DEFENDO AIR/WATER/SUCTION AND BIOPSY VALVE

## (undated) DEVICE — ELEVIEW 10ML AMPOULES SNGL USE

## (undated) NOTE — LETTER
[address]  Authorization for Invasive Procedure                                                                                           I hereby authorize Beto Miranda MD, my physician and his/her assistants (if applicable), which may include medical students, residents, and/or fellows, to perform the following surgical operation/ procedure and administer such anesthesia as may be determined necessary by my physician: Operation/Procedure name (s) COLONOSCOPY with ENDOSCOPIC MUCOSAL RESECTION on [place]   2. I recognize that during the surgical operation/procedure, unforeseen conditions may necessitate additional or different procedures than those listed above. I, therefore, further authorize and request that the above-named surgeon, assistants, or designees perform such procedures as are, in their judgment, necessary and desirable. 3.   My surgeon/physician has discussed prior to my surgery the potential benefits, risks and side effects of this procedure; the likelihood of achieving goals; and potential problems that might occur during recuperation. They also discussed reasonable alternatives to the procedure, including risks, benefits, and side effects related to the alternatives and risks related to not receiving this procedure. I have had all my questions answered and I acknowledge that no guarantee has been made as to the result that may be obtained. 4.   Should the need arise during my operation/procedure, which includes change of level of care prior to discharge, I also consent to the administration of blood and/or blood products. Further, I understand that despite careful testing and screening of blood or blood products by collecting agencies, I may still be subject to ill effects as a result of receiving a blood transfusion and/or blood products.   The following are some, but not all, of the potential risks that can occur: fever and allergic reactions, hemolytic reactions, transmission of diseases such as Hepatitis, AIDS and Cytomegalovirus (CMV) and fluid overload. In the event that I wish to have an autologous transfusion of my own blood, or a directed donor transfusion, I will discuss this with my physician. Check only if Refusing Blood or Blood Products  I understand refusal of blood or blood products as deemed necessary by my physician may have serious consequences to my condition to include possible death. I hereby assume responsibility for my refusal and release the hospital, its personnel, and my physicians from any responsibility for the consequences of my refusal.    o  Refuse   5. I authorize the use of any specimen, organs, tissues, body parts or foreign objects that may be removed from my body during the operation/procedure for diagnosis, research or teaching purposes and their subsequent disposal by hospital authorities. I also authorize the release of specimen test results and/or written reports to my treating physician on the hospital medical staff or other referring or consulting physicians involved in my care, at the discretion of the Pathologist or my treating physician. 6.   I consent to the photographing or videotaping of the operations or procedures to be performed, including appropriate portions of my body for medical, scientific, or educational purposes, provided my identity is not revealed by the pictures or by descriptive texts accompanying them. If the procedure has been photographed/videotaped, the surgeon will obtain the original picture, image, videotape or CD. The hospital will not be responsible for storage, release or maintenance of the picture, image, tape or CD.    7.   I consent to the presence of a  or observers in the operating room as deemed necessary by my physician or their designees.     8.   I recognize that in the event my procedure results in extended X-Ray/fluoroscopy time, I may develop a skin reaction. 9. If I have a Do Not Attempt Resuscitation (DNAR) order in place, that status will be suspended while in the operating room, procedural suite, and during the recovery period unless otherwise explicitly stated by me (or a person authorized to consent on my behalf). The surgeon or my attending physician will determine when the applicable recovery period ends for purposes of reinstating the DNAR order. 10. Patients having a sterilization procedure: I understand that if the procedure is successful the results will be permanent and it will therefore be impossible for me to inseminate, conceive, or bear children. I also understand that the procedure is intended to result in sterility, although the result has not been guaranteed. 11. I acknowledge that my physician has explained sedation/analgesia administration to me including the risk and benefits I consent to the administration of sedation/analgesia as may be necessary or desirable in the judgment of my physician. I CERTIFY THAT I HAVE READ AND FULLY UNDERSTAND THE ABOVE CONSENT TO OPERATION and/or OTHER PROCEDURE.     _________________________________________ _________________________________     ___________________________________  Signature of Patient     Signature of Responsible Person                   Printed Name of Responsible Person                              _________________________________________ ______________________________        ___________________________________  Signature of Witness         Date  Time         Relationship to Patient    STATEMENT OF PHYSICIAN My signature below affirms that prior to the time of the procedure; I have explained to the patient and/or his/her legal representative, the risks and benefits involved in the proposed treatment and any reasonable alternative to the proposed treatment.  I have also explained the risks and benefits involved in refusal of the proposed treatment and alternatives to the proposed treatment and have answered the patient's questions.  If I have a significant financial interest in a co-management agreement or a significant financial interest in any product or implant, or other significant relationship used in this procedure/surgery, I have disclosed this and had a discussion with my patient.     _______________________________________________________________ _____________________________  Titi Jhaveri Physician)                                                                                         (Date)                                   (Time)  Patient Name: Joshua Stapleton    : 1945   Printed: 2023      Medical Record #: [id number]                                              Page 1 of 1

## (undated) NOTE — LETTER
[facility]  [address], [place], [place]    Authorization for Surgical Operation and Procedure                               I hereby authorize Jermaine Candelaria MD, my physician and his/her assistants (if applicable), which may include medical students, residents, and/or fellows, to perform the following surgical operation/ procedure and administer such anesthesia as may be determined necessary by my physician: Operation/Procedure name (s) COLONOSCOPY on Florence Dexter   2.   I recognize that during the surgical operation/procedure, unforeseen conditions may necessitate additional or different procedures than those listed above.  I, therefore, further authorize and request that the above-named surgeon, assistants, or designees perform such procedures as are, in their judgment, necessary and desirable.    3.   My surgeon/physician has discussed prior to my surgery the potential benefits, risks and side effects of this procedure; the likelihood of achieving goals; and potential problems that might occur during recuperation.  They also discussed reasonable alternatives to the procedure, including risks, benefits, and side effects related to the alternatives and risks related to not receiving this procedure.  I have had all my questions answered and I acknowledge that no guarantee has been made as to the result that may be obtained.    4.   Should the need arise during my operation/procedure, which includes change of level of care prior to discharge, I also consent to the administration of blood and/or blood products.  Further, I understand that despite careful testing and screening of blood or blood products by collecting agencies, I may still be subject to ill effects as a result of receiving a blood transfusion and/or blood products.  The following are some, but not all, of the potential risks that can occur: fever and allergic reactions, hemolytic reactions, transmission of diseases such as  Hepatitis, AIDS and Cytomegalovirus (CMV) and fluid overload.  In the event that I wish to have an autologous transfusion of my own blood, or a directed donor transfusion, I will discuss this with my physician.  Check only if Refusing Blood or Blood Products  I understand refusal of blood or blood products as deemed necessary by my physician may have serious consequences to my condition to include possible death. I hereby assume responsibility for my refusal and release the hospital, its personnel, and my physicians from any responsibility for the consequences of my refusal.    o  Refuse   5.   I authorize the use of any specimen, organs, tissues, body parts or foreign objects that may be removed from my body during the operation/procedure for diagnosis, research or teaching purposes and their subsequent disposal by hospital authorities.  I also authorize the release of specimen test results and/or written reports to my treating physician on the hospital medical staff or other referring or consulting physicians involved in my care, at the discretion of the Pathologist or my treating physician.    6.   I consent to the photographing or videotaping of the operations or procedures to be performed, including appropriate portions of my body for medical, scientific, or educational purposes, provided my identity is not revealed by the pictures or by descriptive texts accompanying them.  If the procedure has been photographed/videotaped, the surgeon will obtain the original picture, image, videotape or CD.  The hospital will not be responsible for storage, release or maintenance of the picture, image, tape or CD.    7.   I consent to the presence of a  or observers in the operating room as deemed necessary by my physician or their designees.    8.   I recognize that in the event my procedure results in extended X-Ray/fluoroscopy time, I may develop a skin reaction.    9. If I have a Do Not Attempt  Resuscitation (DNAR) order in place, that status will be suspended while in the operating room, procedural suite, and during the recovery period unless otherwise explicitly stated by me (or a person authorized to consent on my behalf). The surgeon or my attending physician will determine when the applicable recovery period ends for purposes of reinstating the DNAR order.  10. Patients having a sterilization procedure: I understand that if the procedure is successful the results will be permanent and it will therefore be impossible for me to inseminate, conceive, or bear children.  I also understand that the procedure is intended to result in sterility, although the result has not been guaranteed.   11. I acknowledge that my physician has explained sedation/analgesia administration to me including the risk and benefits I consent to the administration of sedation/analgesia as may be necessary or desirable in the judgment of my physician.    I CERTIFY THAT I HAVE READ AND FULLY UNDERSTAND THE ABOVE CONSENT TO OPERATION and/or OTHER PROCEDURE.     ____________________________________  _________________________________        ______________________________  Signature of Patient    Signature of Responsible Person                Printed Name of Responsible Person                                      ____________________________________  _____________________________                ________________________________  Signature of Witness        Date  Time         Relationship to Patient    STATEMENT OF PHYSICIAN My signature below affirms that prior to the time of the procedure; I have explained to the patient and/or his/her legal representative, the risks and benefits involved in the proposed treatment and any reasonable alternative to the proposed treatment. I have also explained the risks and benefits involved in refusal of the proposed treatment and alternatives to the proposed treatment and have answered the patient's  questions. If I have a significant financial interest in a co-management agreement or a significant financial interest in any product or implant, or other significant relationship used in this procedure/surgery, I have disclosed this and had a discussion with my patient.     _____________________________________________________              _____________________________  (Signature of Physician)                                                                                         (Date)                                   (Time)  Patient Name: Florence Dexter      : 1945      Printed: 2024     Medical Record #: [id number]                                      Page 1 of 1

## (undated) NOTE — LETTER
[address]  Authorization for Invasive Procedure                                                                                           1. I hereby authorize Bishop Eunice MD, my physician and his/her assistants (if applicable), which may include medical students, residents, and/or fellows, to perform the following surgical operation/ procedure and administer such anesthesia as may be determined necessary by my physician: Operation/Procedure name (s) [address] on [address]   2. I recognize that during the surgical operation/procedure, unforeseen conditions may necessitate additional or different procedures than those listed above. I, therefore, further authorize and request that the above-named surgeon, assistants, or designees perform such procedures as are, in their judgment, necessary and desirable. 3.   My surgeon/physician has discussed prior to my surgery the potential benefits, risks and side effects of this procedure; the likelihood of achieving goals; and potential problems that might occur during recuperation. They also discussed reasonable alternatives to the procedure, including risks, benefits, and side effects related to the alternatives and risks related to not receiving this procedure. I have had all my questions answered and I acknowledge that no guarantee has been made as to the result that may be obtained. 4.   Should the need arise during my operation/procedure, which includes change of level of care prior to discharge, I also consent to the administration of blood and/or blood products. Further, I understand that despite careful testing and screening of blood or blood products by collecting agencies, I may still be subject to ill effects as a result of receiving a blood transfusion and/or blood products.   The following are some, but not all, of the potential risks that can occur: fever and allergic reactions, hemolytic reactions, transmission of diseases such as Hepatitis, AIDS and Cytomegalovirus (CMV) and fluid overload. In the event that I wish to have an autologous transfusion of my own blood, or a directed donor transfusion, I will discuss this with my physician. Check only if Refusing Blood or Blood Products  I understand refusal of blood or blood products as deemed necessary by my physician may have serious consequences to my condition to include possible death. I hereby assume responsibility for my refusal and release the hospital, its personnel, and my physicians from any responsibility for the consequences of my refusal.    o  Refuse   5. I authorize the use of any specimen, organs, tissues, body parts or foreign objects that may be removed from my body during the operation/procedure for diagnosis, research or teaching purposes and their subsequent disposal by hospital authorities. I also authorize the release of specimen test results and/or written reports to my treating physician on the hospital medical staff or other referring or consulting physicians involved in my care, at the discretion of the Pathologist or my treating physician. 6.   I consent to the photographing or videotaping of the operations or procedures to be performed, including appropriate portions of my body for medical, scientific, or educational purposes, provided my identity is not revealed by the pictures or by descriptive texts accompanying them. If the procedure has been photographed/videotaped, the surgeon will obtain the original picture, image, videotape or CD. The hospital will not be responsible for storage, release or maintenance of the picture, image, tape or CD.    7.   I consent to the presence of a  or observers in the operating room as deemed necessary by my physician or their designees.     8.   I recognize that in the event my procedure results in extended X-Ray/fluoroscopy time, I may develop a skin reaction. 9. If I have a Do Not Attempt Resuscitation (DNAR) order in place, that status will be suspended while in the operating room, procedural suite, and during the recovery period unless otherwise explicitly stated by me (or a person authorized to consent on my behalf). The surgeon or my attending physician will determine when the applicable recovery period ends for purposes of reinstating the DNAR order. 10. Patients having a sterilization procedure: I understand that if the procedure is successful the results will be permanent and it will therefore be impossible for me to inseminate, conceive, or bear children. I also understand that the procedure is intended to result in sterility, although the result has not been guaranteed. 11. I acknowledge that my physician has explained sedation/analgesia administration to me including the risk and benefits I consent to the administration of sedation/analgesia as may be necessary or desirable in the judgment of my physician. I CERTIFY THAT I HAVE READ AND FULLY UNDERSTAND THE ABOVE CONSENT TO OPERATION and/or OTHER PROCEDURE.     _________________________________________ _________________________________     ___________________________________  Signature of Patient     Signature of Responsible Person                   Printed Name of Responsible Person                              _________________________________________ ______________________________        ___________________________________  Signature of Witness         Date  Time         Relationship to Patient    STATEMENT OF PHYSICIAN My signature below affirms that prior to the time of the procedure; I have explained to the patient and/or his/her legal representative, the risks and benefits involved in the proposed treatment and any reasonable alternative to the proposed treatment.  I have also explained the risks and benefits involved in refusal of the proposed treatment and alternatives to the proposed treatment and have answered the patient's questions.  If I have a significant financial interest in a co-management agreement or a significant financial interest in any product or implant, or other significant relationship used in this procedure/surgery, I have disclosed this and had a discussion with my patient.     _______________________________________________________________ _____________________________  Aldon Kawasaki  )                                                                                         (Date)                                   (Time)  Patient Name: Ekaterina Hogue    : 1945   Printed: 4/3/2023      Medical Record #: [id number]                                              Page 1 of 1

## (undated) NOTE — IP AVS SNAPSHOT
[facility] - [facility]    [address], [facility], [facility] ~ [phone]                Discharge Summary   6/14/2017    Marcia Sidhu           Admission Information        Provider Department    6/14/2017 Katey Brandt MD [facility] Shawn Conti COLON AND RECTAL POLYPS, UNDERSTANDING (ENGLISH)    HEMORRHOIDS, UNDERSTANDING (ENGLISH)    DIVERTICULOSIS AND DIVERTICULITIS, UNDERSTANDING (ENGLISH)      Instructions and Information about Your Health     None      Future Appointments        Provider Mahsa Gilbert [address]         Visit Information        Department Dept Phone    6/14/2017  6:42 AM [facility] [phone]         We want to hear from you       We want to hear from you, please share your experience with us by returning the survey

## (undated) NOTE — LETTER
[facility]  Administration of Anesthesia  Florence TURNER agree to be cared for by a physician anesthesiologist alone and/or with a nurse anesthetist, who is specially trained to monitor me and give me medicine to put me to sleep or keep me comfortable during my procedure    I understand that my anesthesiologist and/or anesthetist is not an employee or agent of [facility] or [employer]. He or she works for [facility] [facility]    As the patient asking for anesthesia services, I agree to:  Allow the anesthesiologist (anesthesia doctor) to give me medicine and do additional procedures as necessary. Some examples are: Starting or using an “IV” to give me medicine, fluids or blood during my procedure, and having a breathing tube placed to help me breathe when I’m asleep (intubation). In the event that my heart stops working properly, I understand that my anesthesiologist will make every effort to sustain my life, unless otherwise directed by [facility] Do Not Resuscitate documents.  Tell my anesthesia doctor before my procedure:  If I am pregnant.  The last time that I ate or drank.  iii. All of the medicines I take (including prescriptions, herbal supplements, and pills I can buy without a prescription (including street drugs/illegal medications). Failure to inform my anesthesiologist about these medicines may increase my risk of anesthetic complications.  iv.If I am allergic to anything or have had a reaction to anesthesia before.  I understand how the anesthesia medicine will help me (benefits).  I understand that with any type of anesthesia medicine there are risks:  The most common risks are: nausea, vomiting, sore throat, muscle soreness, damage to my eyes, mouth, or teeth (from breathing tube placement).  Rare risks include: remembering what happened during my procedure, allergic reactions to medications, injury to my airway, heart, lungs, vision, nerves, or muscles  and in extremely rare instances death.  My doctor has explained to me other choices available to me for my care (alternatives).  Pregnant Patients (“epidural”):  I understand that the risks of having an epidural (medicine given into my back to help control pain during labor), include itching, low blood pressure, difficulty urinating, headache or slowing of the baby’s heart. Very rare risks include infection, bleeding, seizure, irregular heart rhythms and nerve injury.  Regional Anesthesia (“spinal”, “epidural”, & “nerve blocks”):  I understand that rare but potential complications include headache, bleeding, infection, seizure, irregular heart rhythms, and nerve injury.    _____________________________________________________________________________  Patient (or Representative) Signature/Relationship to Patient  Date   Time    _____________________________________________________________________________   Name (if used)    Language/Organization   Time    _____________________________________________________________________________  Nurse Anesthetist Signature     Date   Time  _____________________________________________________________________________  Anesthesiologist Signature     Date   Time  I have discussed the procedure and information above with the patient (or patient’s representative) and answered their questions. The patient or their representative has agreed to have anesthesia services.    _____________________________________________________________________________  Witness        Date   Time  I have verified that the signature is that of the patient or patient’s representative, and that it was signed before the procedure  Patient Name: Florence Dexter     : 1945                 Printed: 2024 at 10:55 AM    Medical Record #: [id number]                                            Page 1 of 1  ----------ANESTHESIA CONSENT----------